# Patient Record
Sex: MALE | Race: ASIAN | NOT HISPANIC OR LATINO
[De-identification: names, ages, dates, MRNs, and addresses within clinical notes are randomized per-mention and may not be internally consistent; named-entity substitution may affect disease eponyms.]

---

## 2022-08-10 ENCOUNTER — NON-APPOINTMENT (OUTPATIENT)
Age: 32
End: 2022-08-10

## 2022-08-10 ENCOUNTER — APPOINTMENT (OUTPATIENT)
Dept: FAMILY MEDICINE | Facility: CLINIC | Age: 32
End: 2022-08-10

## 2022-08-10 VITALS
OXYGEN SATURATION: 98 % | WEIGHT: 220 LBS | BODY MASS INDEX: 31.5 KG/M2 | SYSTOLIC BLOOD PRESSURE: 121 MMHG | DIASTOLIC BLOOD PRESSURE: 80 MMHG | TEMPERATURE: 97.2 F | HEART RATE: 76 BPM | HEIGHT: 70 IN

## 2022-08-10 DIAGNOSIS — Z87.891 PERSONAL HISTORY OF NICOTINE DEPENDENCE: ICD-10-CM

## 2022-08-10 DIAGNOSIS — Z78.9 OTHER SPECIFIED HEALTH STATUS: ICD-10-CM

## 2022-08-10 DIAGNOSIS — Z23 ENCOUNTER FOR IMMUNIZATION: ICD-10-CM

## 2022-08-10 DIAGNOSIS — J02.9 ACUTE PHARYNGITIS, UNSPECIFIED: ICD-10-CM

## 2022-08-10 PROCEDURE — 99202 OFFICE O/P NEW SF 15 MIN: CPT | Mod: 25

## 2022-08-10 PROCEDURE — 90715 TDAP VACCINE 7 YRS/> IM: CPT

## 2022-08-10 PROCEDURE — 90471 IMMUNIZATION ADMIN: CPT

## 2022-08-10 PROCEDURE — 99385 PREV VISIT NEW AGE 18-39: CPT | Mod: 25

## 2022-08-10 RX ORDER — FLUTICASONE PROPIONATE 50 UG/1
50 SPRAY, METERED NASAL
Qty: 1 | Refills: 1 | Status: ACTIVE | COMMUNITY
Start: 2022-08-10 | End: 1900-01-01

## 2022-08-10 NOTE — HISTORY OF PRESENT ILLNESS
[de-identified] : 31 yo M with no significant PMH presents for annual.\par \par Itchy throat for past 2 years. Feels need to cough in morning. Worse in mornings. No seasonal allergies, no congestion. No bitter/sour taste in mornings. Saw ENT, trialed prilosec for 1 week, did not feel much of a different. Saw GI, did not have evidence of acid reflux on endoscopy.\par

## 2022-08-11 ENCOUNTER — NON-APPOINTMENT (OUTPATIENT)
Age: 32
End: 2022-08-11

## 2022-08-11 LAB
25(OH)D3 SERPL-MCNC: 27.2 NG/ML
ALBUMIN SERPL ELPH-MCNC: 4.9 G/DL
ALP BLD-CCNC: 67 U/L
ALT SERPL-CCNC: 14 U/L
ANION GAP SERPL CALC-SCNC: 15 MMOL/L
AST SERPL-CCNC: 14 U/L
BASOPHILS # BLD AUTO: 0.14 K/UL
BASOPHILS NFR BLD AUTO: 1.6 %
BILIRUB SERPL-MCNC: 0.8 MG/DL
BUN SERPL-MCNC: 12 MG/DL
CALCIUM SERPL-MCNC: 10 MG/DL
CHLORIDE SERPL-SCNC: 104 MMOL/L
CHOLEST SERPL-MCNC: 188 MG/DL
CO2 SERPL-SCNC: 23 MMOL/L
COVID-19 NUCLEOCAPSID  GAM ANTIBODY INTERPRETATION: POSITIVE
COVID-19 SPIKE DOMAIN ANTIBODY INTERPRETATION: POSITIVE
CREAT SERPL-MCNC: 1.04 MG/DL
EGFR: 99 ML/MIN/1.73M2
EOSINOPHIL # BLD AUTO: 0.36 K/UL
EOSINOPHIL NFR BLD AUTO: 4.2 %
ESTIMATED AVERAGE GLUCOSE: 103 MG/DL
GLUCOSE SERPL-MCNC: 90 MG/DL
HBA1C MFR BLD HPLC: 5.2 %
HCT VFR BLD CALC: 53.3 %
HDLC SERPL-MCNC: 47 MG/DL
HGB BLD-MCNC: 17.6 G/DL
IMM GRANULOCYTES NFR BLD AUTO: 1.2 %
LDLC SERPL CALC-MCNC: 102 MG/DL
LYMPHOCYTES # BLD AUTO: 2.56 K/UL
LYMPHOCYTES NFR BLD AUTO: 29.5 %
MAN DIFF?: NORMAL
MCHC RBC-ENTMCNC: 31.4 PG
MCHC RBC-ENTMCNC: 33 GM/DL
MCV RBC AUTO: 95 FL
MONOCYTES # BLD AUTO: 0.36 K/UL
MONOCYTES NFR BLD AUTO: 4.2 %
NEUTROPHILS # BLD AUTO: 5.15 K/UL
NEUTROPHILS NFR BLD AUTO: 59.3 %
NONHDLC SERPL-MCNC: 141 MG/DL
PLATELET # BLD AUTO: 249 K/UL
POTASSIUM SERPL-SCNC: 4.4 MMOL/L
PROT SERPL-MCNC: 6.9 G/DL
RBC # BLD: 5.61 M/UL
RBC # FLD: 12.1 %
SARS-COV-2 AB SERPL IA-ACNC: >250 U/ML
SARS-COV-2 AB SERPL QL IA: 10.7 INDEX
SODIUM SERPL-SCNC: 142 MMOL/L
TRIGL SERPL-MCNC: 198 MG/DL
TSH SERPL-ACNC: 0.78 UIU/ML
VIT B12 SERPL-MCNC: 650 PG/ML
WBC # FLD AUTO: 8.67 K/UL

## 2023-08-22 ENCOUNTER — APPOINTMENT (OUTPATIENT)
Dept: INTERNAL MEDICINE | Facility: CLINIC | Age: 33
End: 2023-08-22
Payer: MEDICAID

## 2023-08-22 VITALS
BODY MASS INDEX: 34.36 KG/M2 | HEIGHT: 70 IN | OXYGEN SATURATION: 96 % | SYSTOLIC BLOOD PRESSURE: 125 MMHG | HEART RATE: 77 BPM | DIASTOLIC BLOOD PRESSURE: 86 MMHG | WEIGHT: 240 LBS

## 2023-08-22 DIAGNOSIS — Z11.3 ENCOUNTER FOR SCREENING FOR INFECTIONS WITH A PREDOMINANTLY SEXUAL MODE OF TRANSMISSION: ICD-10-CM

## 2023-08-22 DIAGNOSIS — Z00.00 ENCOUNTER FOR GENERAL ADULT MEDICAL EXAMINATION W/OUT ABNORMAL FINDINGS: ICD-10-CM

## 2023-08-22 PROCEDURE — 99395 PREV VISIT EST AGE 18-39: CPT

## 2023-08-22 NOTE — HISTORY OF PRESENT ILLNESS
[FreeTextEntry1] : annual [de-identified] : 34 yo M with no significant PMH presents for annual. Reports itchy throat for past 3 years. Daily. Had evaluation with ENT and GI. No GERD per GI. Was trialed on prilosec but didn't help.   Gained 20 lbs since past year.

## 2023-08-24 ENCOUNTER — TRANSCRIPTION ENCOUNTER (OUTPATIENT)
Age: 33
End: 2023-08-24

## 2023-08-28 ENCOUNTER — TRANSCRIPTION ENCOUNTER (OUTPATIENT)
Age: 33
End: 2023-08-28

## 2023-08-28 LAB
25(OH)D3 SERPL-MCNC: 21.9 NG/ML
C TRACH RRNA SPEC QL NAA+PROBE: NOT DETECTED
CHOLEST SERPL-MCNC: 198 MG/DL
ESTIMATED AVERAGE GLUCOSE: 108 MG/DL
HAV IGM SER QL: NONREACTIVE
HBA1C MFR BLD HPLC: 5.4 %
HBV CORE IGM SER QL: NONREACTIVE
HBV SURFACE AG SER QL: NONREACTIVE
HCV AB SER QL: NONREACTIVE
HCV S/CO RATIO: 0.09 S/CO
HDLC SERPL-MCNC: 50 MG/DL
HIV1+2 AB SPEC QL IA.RAPID: NONREACTIVE
LDLC SERPL CALC-MCNC: 113 MG/DL
N GONORRHOEA RRNA SPEC QL NAA+PROBE: NOT DETECTED
NONHDLC SERPL-MCNC: 149 MG/DL
SOURCE AMPLIFICATION: NORMAL
T PALLIDUM AB SER QL IA: NEGATIVE
TRIGL SERPL-MCNC: 204 MG/DL
TSH SERPL-ACNC: 0.76 UIU/ML
VIT B12 SERPL-MCNC: 541 PG/ML

## 2023-11-22 ENCOUNTER — APPOINTMENT (OUTPATIENT)
Dept: INTERNAL MEDICINE | Facility: CLINIC | Age: 33
End: 2023-11-22
Payer: MEDICAID

## 2023-11-22 VITALS
HEART RATE: 77 BPM | DIASTOLIC BLOOD PRESSURE: 83 MMHG | OXYGEN SATURATION: 99 % | SYSTOLIC BLOOD PRESSURE: 132 MMHG | BODY MASS INDEX: 34.36 KG/M2 | HEIGHT: 70 IN | WEIGHT: 240 LBS | TEMPERATURE: 97.9 F

## 2023-11-22 DIAGNOSIS — E55.9 VITAMIN D DEFICIENCY, UNSPECIFIED: ICD-10-CM

## 2023-11-22 DIAGNOSIS — Z11.1 ENCOUNTER FOR SCREENING FOR RESPIRATORY TUBERCULOSIS: ICD-10-CM

## 2023-11-22 DIAGNOSIS — E66.9 OBESITY, UNSPECIFIED: ICD-10-CM

## 2023-11-22 PROCEDURE — G0447 BEHAVIOR COUNSEL OBESITY 15M: CPT | Mod: 59

## 2023-11-22 PROCEDURE — 99214 OFFICE O/P EST MOD 30 MIN: CPT | Mod: 25

## 2023-11-27 LAB
M TB IFN-G BLD-IMP: NEGATIVE
QUANTIFERON TB PLUS MITOGEN MINUS NIL: 5.47 IU/ML
QUANTIFERON TB PLUS NIL: 0.08 IU/ML
QUANTIFERON TB PLUS TB1 MINUS NIL: -0.02 IU/ML
QUANTIFERON TB PLUS TB2 MINUS NIL: -0.03 IU/ML

## 2024-08-14 ENCOUNTER — APPOINTMENT (OUTPATIENT)
Dept: DERMATOLOGY | Facility: CLINIC | Age: 34
End: 2024-08-14